# Patient Record
(demographics unavailable — no encounter records)

---

## 2024-12-29 NOTE — EDPHYS
Physician Documentation                                                                           

 [facility]                                                                 

Name: Jeannie Forde                                                                              

Age: 22 yrs                                                                                       

Sex: Female                                                                                       

: 2002                                                                                   

MRN: [id number]                                                                                   

Arrival Date: 2024                                                                          

Time: 18:49                                                                                       

Account#: [id number]                                                                            

Bed 10                                                                                            

Private MD:                                                                                       

ED Physician Bruce Jimenez                                                                         

HPI:                                                                                              

                                                                                             

18:59 This 22 yrs old Female presents to ER via Ambulatory with complaints of Abdominal Pain, kb  

      Vomiting/Diarrhea.                                                                          

18:59 Patient is a 22-year-old female who presents for nausea, vomiting, diarrhea that        kb  

      started at 7 AM. Reports abdominal cramping when she vomits only. States she did have a     

      cough this morning. Unknown fever. No aggravating or alleviating factors..                  

                                                                                                  

Historical:                                                                                       

- Allergies:                                                                                      

18:59 No Known Allergies;                                                                     tm6 

- PMHx:                                                                                           

18:57 PCOS;                                                                                   tm6 

- PSHx:                                                                                           

18:59 None;                                                                                   tm6 

                                                                                                  

- Immunization history:: Flu vaccine is not up to date.                                           

- Infectious Disease History:: Denies.                                                            

- Social history:: Smoking status: Patient denies any tobacco usage or history of.                

                                                                                                  

                                                                                                  

ROS:                                                                                              

18:59 Constitutional: As per HPI                                                              kb  

                                                                                                  

Exam:                                                                                             

18:59 Constitutional:  This is a well developed, well nourished patient who is awake, alert,  kb  

      and in no acute distress. Head/Face:  Normocephalic, atraumatic. ENT:  Moist Mucous         

      membranes Cardiovascular:  Regular rate  Respiratory:  Respirations even and unlabored.     

      No increased work of breathing. Talking in full sentences Abdomen/GI:  Soft,                

      non-tender. No distention Skin:  Warm, dry with normal turgor.  Normal color. MS/           

      Extremity:  Pulses equal, no cyanosis.  Neurovascular intact.  Full, normal range of        

      motion. Neuro:  Awake and alert, GCS 15, oriented to person, place, time, and               

      situation.                                                                                  

                                                                                                  

Vital Signs:                                                                                      

18:57  / 74; Pulse 112; Resp 19; Temp 99.7(O); Pulse Ox 97% on R/A; MAP 93 mmHg; Weight tm6 

      104.33 kg; Height 5 ft. 5 in. ; Pain 6/10;                                                  

18:58 Pain 6/10;                                                                              tm6 

21:41  / 85; Pulse 91; Resp 16; Temp 98.7(O); Pulse Ox 100% on R/A; Pain 0/10;          le1 

18:57 Body Mass Index 38.27 (104.33 kg, 165.1 cm)                                             tm6 

18:57 Pain Scale: Adult                                                                       tm6 

18:58 Pain Scale: Adult                                                                       tm6 

21:41 Pain Scale: Adult                                                                       le1 

                                                                                                  

MDM:                                                                                              

18:55 Medical Screening Exam initiated                                                        kb  

21:24 Differential diagnosis: non-specific abd pain, gastroenteritis, dehydration. Data       kb  

      reviewed: vital signs, nurses notes. Test considered but Not performed: CT: ct abd          

      considered but pt has no abd tenderness. Counseling: I had a detailed discussion with       

      the patient and/or guardian regarding the historical points, exam findings, and any         

      diagnostic results supporting the discharge/admit diagnosis, lab results, the need for      

      outpatient follow up, a family practitioner, to return to the emergency department if       

      symptoms worsen or persist or if there are any questions or concerns that arise at          

      home. Response to treatment: the patient's symptoms have markedly improved after            

      treatment, tolerating po intake.                                                            

                                                                                                  

                                                                                             

18:58 Order name: CBC with Diff; Complete Time: 21:37                                         kb  

                                                                                             

18:58 Order name: CMP; Complete Time: 20:27                                                   kb  

                                                                                             

18:58 Order name: Lipase; Complete Time: 20:27                                                kb  

                                                                                             

18:58 Order name: Pregnancy Test, Urine; Complete Time: 20:25                                 kb  

                                                                                             

18:58 Order name: Urinalysis w/ reflexes; Complete Time: 20:25                                kb  

                                                                                             

18:58 Order name: Flu; Complete Time: 20:33                                                   kb  

                                                                                             

18:58 Order name: SARS-COV-2 Antigen Rapid; Complete Time: 20:33                              kb  

                                                                                             

21:31 Order name: CBC Smear Scan; Complete Time: 21:37                                        EDMS

                                                                                             

18:58 Order name: IV Saline Lock; Complete Time: 20:15                                        kb  

                                                                                             

18:58 Order name: Labs collected and sent; Complete Time: 20:15                               kb  

                                                                                             

20:43 Order name: PO challenge; Complete Time: 21:21                                          kb  

                                                                                                  

Administered Medications:                                                                         

20:15 Drug: Famotidine IVP 20 mg IVP once; dilute with 10 mL 0.9% NaCl; give over 2 minutes   tm6 

      Route: IVP; Site: right antecubital;                                                        

21:29 Follow up: Response: No adverse reaction; Nausea is decreased                           le1 

20:15 Drug: Ondansetron IVP 4 mg IVP once; over 2 minutes Route: IVP; Site: right antecubital;tm6 

21:29 Follow up: Response: Nausea is decreased                                                le1 

20:15 Drug: NS 0.9% IV 1000 ml IV at 1 bolus Per protocol; to be given as a bolus over 60     tm6 

      minutes Route: IV; Rate: 1 bolus; Site: right antecubital;                                  

                                                                                                  

                                                                                                  

Disposition:                                                                                      

                                                                                             

07:07 Co-signature as Attending Physician, Bruce Jimenez MD I reviewed the patient's care       rn  

      provided by the Advanced Practice Provider and agree with the diagnosis and treatment       

      plan.                                                                                       

                                                                                                  

Disposition Summary:                                                                              

24 21:26                                                                                    

Discharge Ordered                                                                                 

 Notes:       Location: Home                                                                        
  kb

      Condition: Stable                                                                       kb  

      Diagnosis                                                                                   

        - Nausea with vomiting, unspecified                                                   kb  

        - Diarrhea, unspecified                                                               kb  

      Followup:                                                                               kb  

        - With: Emergency Department                                                               

        - When: As needed                                                                          

        - Reason: Worsening of condition                                                           

      Followup:                                                                               kb  

        - With: Private Physician                                                                  

        - When: 2 - 3 days                                                                         

        - Reason: Recheck today's complaints, Continuance of care, Re-evaluation by your           

      physician                                                                                   

      Discharge Instructions:                                                                     

        - Discharge Summary Sheet                                                             kb  

        - Food Choices to Help Relieve Diarrhea, Adult                                        kb  

        - Viral Gastroenteritis, Adult, Easy-to-Read                                          kb  

      Forms:                                                                                      

        - Medication Reconciliation Form                                                      kb  

        - Antibiotic Education                                                                kb  

        - Prescription Opioid Use                                                             kb  

        - Patient Portal Instructions                                                         kb  

        - Leadership Thank You Letter                                                         kb  

      Prescriptions:                                                                              

        - Zofran 4 mg Oral tablet                                                                  

            - take 1 tablet ORAL route every 6 hours As needed; 12 tablet; Refills: 0,        kb  

      Product Selection Permitted                                                                 

        - dicyclomine 20 mg Oral tablet                                                            

            - take 1 tablet ORAL route 4 times per day As needed; 20 tablet; Refills: 0,      kb  

      Product Selection Permitted                                                                 

Signatures:                                                                                       

Dispatcher [employer]                           Mary Christianson, BAR DELANEYP-Bruce Koehler MD MD   rn                                                   

Matthew Bishop RN                   RN   tm6                                                  

Pearl Mckinney                      RN   le1                                                  

                                                                                                  

**************************************************************************************************

## 2024-12-29 NOTE — ER
Nurse's Notes                                                                                     

 [facility]                                                                 

Name: Jeannie Forde                                                                              

Age: 22 yrs                                                                                       

Sex: Female                                                                                       

: 2002                                                                                   

MRN: [id number]                                                                                   

Arrival Date: 2024                                                                          

Time: 18:49                                                                                       

Account#: [id number]                                                                            

Bed 10                                                                                            

Private MD:                                                                                       

Diagnosis: Nausea with vomiting, unspecified;Diarrhea, unspecified                                

                                                                                                  

Presentation:                                                                                     

                                                                                             

18:56 Chief complaint: Patient states: n/v/d since 0700 this morning. Some congestion.        tm6 

      Coronavirus screen: Client denies travel out of the [place] in the last 14 days. Ebola         

      Screen: Patient negative for fever greater than or equal to 101.5 degrees Fahrenheit,       

      and additional compatible Ebola Virus Disease symptoms Patient denies exposure to           

      infectious person. Patient denies travel to an Ebola-affected area in the 21 days           

      before illness onset. No symptoms or risks identified at this time. Initial Sepsis          

      Screen:. Risk Assessment: Do you want to hurt yourself or someone else? Patient reports     

      no desire to harm self or others. Onset of symptoms was 2024 at 07:00.         

18:56 Method Of Arrival: Ambulatory                                                           tm6 

18:58 Initial Sepsis Screen: Does the patient meet any 2 criteria? HR > 90 bpm. Does the      tm6 

      patient have a suspected source of infection? No. Patient's initial sepsis screen is        

      negative.                                                                                   

18:58 Acuity: JOSE A 3                                                                           tm6 

                                                                                                  

Triage Assessment:                                                                                

18:59 General: Appears in no apparent distress. Behavior is calm, cooperative. Pain:          tm6 

      Complains of pain in abdomen Pain currently is 6 out of 10 on a pain scale. EENT: No        

      signs and/or symptoms were reported regarding the EENT system. EENT: Reports nasal          

      congestion nasal discharge. Neuro: Level of Consciousness is awake, alert, obeys            

      commands, Oriented to person, place, time, situation. Cardiovascular: Patient's skin is     

      warm and dry. Respiratory: Airway is patent Respiratory effort is even, unlabored,          

      Respiratory pattern is regular, symmetrical. GI: Abdomen is round Reports lower             

      abdominal pain, upper abdominal pain, diarrhea, nausea, vomiting. : No signs and/or       

      symptoms were reported regarding the genitourinary system. Derm: No signs and/or            

      symptoms reported regarding the dermatologic system. Musculoskeletal: No signs and/or       

      symptoms reported regarding the musculoskeletal system.                                     

                                                                                                  

Historical:                                                                                       

- Allergies:                                                                                      

18:59 No Known Allergies;                                                                     tm6 

- PMHx:                                                                                           

18:57 PCOS;                                                                                   tm6 

- PSHx:                                                                                           

18:59 None;                                                                                   tm6 

                                                                                                  

- Immunization history:: Flu vaccine is not up to date.                                           

- Infectious Disease History:: Denies.                                                            

- Social history:: Smoking status: Patient denies any tobacco usage or history of.                

                                                                                                  

                                                                                                  

Vital Signs:                                                                                      

18:57  / 74; Pulse 112; Resp 19; Temp 99.7(O); Pulse Ox 97% on R/A; MAP 93 mmHg; Weight tm6 

      104.33 kg; Height 5 ft. 5 in. ; Pain 6/10;                                                  

18:58 Pain 6/10;                                                                              tm6 

21:41  / 85; Pulse 91; Resp 16; Temp 98.7(O); Pulse Ox 100% on R/A; Pain 0/10;          le1 

18:57 Body Mass Index 38.27 (104.33 kg, 165.1 cm)                                             tm6 

18:57 Pain Scale: Adult                                                                       tm6 

18:58 Pain Scale: Adult                                                                       tm6 

21:41 Pain Scale: Adult                                                                       le1 

                                                                                                  

ED Course:                                                                                        

18:52 Patient arrived in ED.                                                                  mr  

18[facility]55 [facility]                                                        [facility]  

18:55 Bruce Jimenez MD is Attending Physician.                                                kb  

18:59 Triage completed.                                                                       tm6 

18:59 Arm band placed on right wrist.                                                         tm6 

19:45 Inserted saline lock: 20 gauge in right antecubital area, using aseptic technique.      lg3 

      Blood collected.                                                                            

19:46 CBC with Diff Sent.                                                                     kmf 

19:46 CMP Sent.                                                                               kmf 

19:46 Lipase Sent.                                                                            kmf 

19:46 Pregnancy Test, Urine Sent.                                                             kmf 

19:46 Urinalysis w/ reflexes Sent.                                                            kmf 

20:19 Pearl Mckinney, RN is Primary Nurse.                                                le1 

                                                                                                  

Administered Medications:                                                                         

20:15 Drug: Famotidine IVP 20 mg IVP once; dilute with 10 mL 0.9% NaCl; give over 2 minutes   tm6 

      Route: IVP; Site: right antecubital;                                                        

21:29 Follow up: Response: No adverse reaction; Nausea is decreased                           le1 

20:15 Drug: Ondansetron IVP 4 mg IVP once; over 2 minutes Route: IVP; Site: right antecubital;tm6 

21:29 Follow up: Response: Nausea is decreased                                                le1 

20:15 Drug: NS 0.9% IV 1000 ml IV at 1 bolus Per protocol; to be given as a bolus over 60     tm6 

      minutes Route: IV; Rate: 1 bolus; Site: right antecubital;                                  

                                                                                                  

                                                                                                  

Outcome:                                                                                          

21:26 Discharge ordered by MD. alvarez  

21:42 Patient left the ED.                                                                    le1 

                                                                                                  

Signatures:                                                                                       

Mary Marshall FNP-C                 FNP-Cee Antunez, [facility]                       Reg  mr                                                   

Suze Schneider RN                         RN   lg3                                                  

Lulu Andrews                     [facility]                                                  

Matthew Bishop RN RN   tm6                                                  

Pearl Mckinney RN                  RN   le1                                                  

                                                                                                  

**************************************************************************************************